# Patient Record
Sex: MALE | Race: WHITE | NOT HISPANIC OR LATINO | Employment: FULL TIME | URBAN - METROPOLITAN AREA
[De-identification: names, ages, dates, MRNs, and addresses within clinical notes are randomized per-mention and may not be internally consistent; named-entity substitution may affect disease eponyms.]

---

## 2017-07-19 ENCOUNTER — HOSPITAL ENCOUNTER (EMERGENCY)
Facility: HOSPITAL | Age: 27
Discharge: HOME | End: 2017-07-19
Attending: EMERGENCY MEDICINE
Payer: COMMERCIAL

## 2017-07-19 ENCOUNTER — APPOINTMENT (EMERGENCY)
Dept: CT IMAGING | Facility: HOSPITAL | Age: 27
End: 2017-07-19
Payer: COMMERCIAL

## 2017-07-19 VITALS
DIASTOLIC BLOOD PRESSURE: 82 MMHG | OXYGEN SATURATION: 99 % | RESPIRATION RATE: 18 BRPM | WEIGHT: 183.64 LBS | HEART RATE: 64 BPM | SYSTOLIC BLOOD PRESSURE: 127 MMHG | TEMPERATURE: 98.2 F

## 2017-07-19 DIAGNOSIS — G43.909 MIGRAINE HEADACHE: Primary | ICD-10-CM

## 2017-07-19 PROCEDURE — 99284 EMERGENCY DEPT VISIT MOD MDM: CPT

## 2017-07-19 PROCEDURE — 70450 CT HEAD/BRAIN W/O DYE: CPT

## 2017-07-19 RX ORDER — DIAZEPAM 5 MG/1
5 TABLET ORAL ONCE
Status: COMPLETED | OUTPATIENT
Start: 2017-07-19 | End: 2017-07-19

## 2017-07-19 RX ORDER — BUTALBITAL, ACETAMINOPHEN AND CAFFEINE 50; 325; 40 MG/1; MG/1; MG/1
1 TABLET ORAL EVERY 6 HOURS PRN
Qty: 20 TABLET | Refills: 0 | Status: SHIPPED | OUTPATIENT
Start: 2017-07-19 | End: 2021-02-26

## 2017-07-19 RX ORDER — ACETAMINOPHEN 500 MG
1000 TABLET ORAL EVERY 6 HOURS PRN
COMMUNITY
End: 2021-02-26

## 2017-07-19 RX ORDER — METOCLOPRAMIDE 10 MG/1
10 TABLET ORAL ONCE
Status: COMPLETED | OUTPATIENT
Start: 2017-07-19 | End: 2017-07-19

## 2017-07-19 RX ORDER — BUTALBITAL, ACETAMINOPHEN AND CAFFEINE 50; 325; 40 MG/1; MG/1; MG/1
1 TABLET ORAL ONCE
Status: COMPLETED | OUTPATIENT
Start: 2017-07-19 | End: 2017-07-19

## 2017-07-19 RX ADMIN — METOCLOPRAMIDE HYDROCHLORIDE 10 MG: 10 TABLET ORAL at 21:57

## 2017-07-19 RX ADMIN — BUTALBITAL, ACETAMINOPHEN, AND CAFFEINE 1 TABLET: 50; 325; 40 TABLET ORAL at 21:57

## 2017-07-19 RX ADMIN — DIAZEPAM 5 MG: 5 TABLET ORAL at 21:57

## 2018-08-28 ENCOUNTER — APPOINTMENT (OUTPATIENT)
Dept: URGENT CARE | Age: 28
End: 2018-08-28
Payer: OTHER MISCELLANEOUS

## 2018-08-28 PROCEDURE — 99283 EMERGENCY DEPT VISIT LOW MDM: CPT

## 2018-08-28 PROCEDURE — G0382 LEV 3 HOSP TYPE B ED VISIT: HCPCS

## 2018-08-29 ENCOUNTER — APPOINTMENT (OUTPATIENT)
Dept: URGENT CARE | Age: 28
End: 2018-08-29
Payer: OTHER MISCELLANEOUS

## 2018-08-29 PROCEDURE — 99213 OFFICE O/P EST LOW 20 MIN: CPT

## 2020-09-29 ENCOUNTER — OFFICE VISIT (OUTPATIENT)
Dept: URGENT CARE | Age: 30
End: 2020-09-29
Payer: COMMERCIAL

## 2020-09-29 VITALS
WEIGHT: 190 LBS | TEMPERATURE: 98.2 F | HEART RATE: 84 BPM | HEIGHT: 72 IN | SYSTOLIC BLOOD PRESSURE: 133 MMHG | BODY MASS INDEX: 25.73 KG/M2 | OXYGEN SATURATION: 100 % | DIASTOLIC BLOOD PRESSURE: 86 MMHG | RESPIRATION RATE: 16 BRPM

## 2020-09-29 DIAGNOSIS — L03.116 CELLULITIS OF LEFT LOWER EXTREMITY: ICD-10-CM
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
## 2020-09-29 PROCEDURE — 99212 OFFICE O/P EST SF 10 MIN: CPT | Performed by: FAMILY MEDICINE

## 2020-09-29 RX ORDER — CLINDAMYCIN HYDROCHLORIDE 300 MG/1
300 CAPSULE ORAL 3 TIMES DAILY
Qty: 30 CAPSULE | Refills: 0 | Status: SHIPPED | OUTPATIENT
Start: 2020-09-29 | End: 2020-10-09

## 2020-09-29 NOTE — PROGRESS NOTES
[facility]        NAME: Berna Carnes is a 27 y o  male  : 1990    MRN: [id number]  DATE: 2020  TIME: 7:56 PM    Assessment and Plan   Insect bites and stings, initial encounter [W57  XXXA]  1  Insect bites and stings, initial encounter     2  Cellulitis of left lower extremity  clindamycin (CLEOCIN) 300 MG capsule    mupirocin (BACTROBAN) 2 % ointment         Patient Instructions     Patient Instructions   Clindamycin 3 times a day until finished (please take probiotics)  Prescription antibiotic ointment to areas 3 times a day until healed  Recheck/follow-up with family physician as discussed  Please go to the hospital emergency department if needed  Follow up with PCP in 3-5 days  Proceed to  ER if symptoms worsen  Chief Complaint     Chief Complaint   Patient presents with    Rash     Pt complaining of a rash to b/l ankles x1 week  States it occassional itches  History of Present Illness       Insect bites of bilateral feet/ankles/lower legs; redness of some of the skin lesions of the left ankle/left lower leg areas      Review of Systems   Review of Systems   Skin: Positive for wound          Insect bites of both lower legs/ankle/feet with redness of some of the bites on the left lower leg/left ankle areas         Current Medications       Current Outpatient Medications:     acetaminophen (TYLENOL) 500 mg tablet, Take 1,000 mg by mouth every 6 (six) hours as needed for mild pain, Disp: , Rfl:     butalbital-acetaminophen-caffeine (FIORICET) -40 mg per tablet, Take 1 tablet by mouth every 6 (six) hours as needed for headaches (Patient not taking: Reported on 2020), Disp: 20 tablet, Rfl: 0    clindamycin (CLEOCIN) 300 MG capsule, Take 1 capsule (300 mg total) by mouth 3 (three) times a day for 30 doses, Disp: 30 capsule, Rfl: 0    mupirocin (BACTROBAN) 2 % ointment, Apply topically 3 (three) times a day, Disp: 22 g, Rfl: 2    Current Allergies     Allergies as of 09/29/2020 - Reviewed 09/29/2020   Allergen Reaction Noted    Aspirin Anaphylaxis 07/19/2017    Codeine Anaphylaxis 07/19/2017    Nsaids Anaphylaxis 07/19/2017            The following portions of the patient's history were reviewed and updated as appropriate: allergies, current medications, past family history, past medical history, past social history, past surgical history and problem list      History reviewed  No pertinent past medical history  Past Surgical History:   Procedure Laterality Date    TONSILLECTOMY         History reviewed  No pertinent family history  Medications have been verified  Objective   /86 (BP Location: Left arm, Patient Position: Sitting)   Pulse 84   Temp 98 2 °F (36 8 °C) (Temporal)   Resp 16   Ht 6' (1 829 m)   Wt 86 2 kg (190 lb)   SpO2 100%   BMI 25 77 kg/m²        Physical Exam     Physical Exam  Vitals signs and nursing note reviewed  Skin:     Comments: Insect bites both lower leg/ankle/feet; erythema and drainage of some of the skin lesions of the left lower leg/left ankle areas   Neurological:      Mental Status: He is alert and oriented to person, place, and time

## 2020-09-29 NOTE — PATIENT INSTRUCTIONS
Clindamycin 3 times a day until finished (please take probiotics)  Prescription antibiotic ointment to areas 3 times a day until healed  Recheck/follow-up with family physician as discussed  Please go to the hospital emergency department if needed

## 2020-12-29 ENCOUNTER — APPOINTMENT (OUTPATIENT)
Dept: URGENT CARE | Age: 30
End: 2020-12-29
Attending: FAMILY MEDICINE

## 2020-12-29 ENCOUNTER — TRANSCRIBE ORDERS (OUTPATIENT)
Dept: ADMINISTRATIVE | Age: 30
End: 2020-12-29

## 2020-12-29 DIAGNOSIS — Z11.59 SPECIAL SCREENING EXAMINATION FOR UNSPECIFIED VIRAL DISEASE: Primary | ICD-10-CM

## 2020-12-29 DIAGNOSIS — Z11.59 SPECIAL SCREENING EXAMINATION FOR UNSPECIFIED VIRAL DISEASE: ICD-10-CM

## 2020-12-29 PROCEDURE — U0003 INFECTIOUS AGENT DETECTION BY NUCLEIC ACID (DNA OR RNA); SEVERE ACUTE RESPIRATORY SYNDROME CORONAVIRUS 2 (SARS-COV-2) (CORONAVIRUS DISEASE [COVID-19]), AMPLIFIED PROBE TECHNIQUE, MAKING USE OF HIGH THROUGHPUT TECHNOLOGIES AS DESCRIBED BY CMS-2020-01-R: HCPCS

## 2020-12-31 LAB — SARS-COV-2 RNA SPEC QL NAA+PROBE: NOT DETECTED

## 2021-02-26 ENCOUNTER — APPOINTMENT (EMERGENCY)
Dept: RADIOLOGY | Facility: HOSPITAL | Age: 31
End: 2021-02-26
Payer: COMMERCIAL

## 2021-02-26 ENCOUNTER — HOSPITAL ENCOUNTER (EMERGENCY)
Facility: HOSPITAL | Age: 31
Discharge: HOME | End: 2021-02-26
Attending: EMERGENCY MEDICINE
Payer: COMMERCIAL

## 2021-02-26 VITALS
TEMPERATURE: 98.5 F | OXYGEN SATURATION: 98 % | SYSTOLIC BLOOD PRESSURE: 155 MMHG | WEIGHT: 190 LBS | BODY MASS INDEX: 25.77 KG/M2 | DIASTOLIC BLOOD PRESSURE: 90 MMHG | RESPIRATION RATE: 16 BRPM | HEART RATE: 78 BPM

## 2021-02-26 DIAGNOSIS — R00.2 PALPITATIONS: Primary | ICD-10-CM

## 2021-02-26 LAB
ALBUMIN SERPL BCP-MCNC: 4 G/DL (ref 3.5–5)
ALP SERPL-CCNC: 92 U/L (ref 46–116)
ALT SERPL W P-5'-P-CCNC: 42 U/L (ref 12–78)
ANION GAP SERPL CALCULATED.3IONS-SCNC: 5 MMOL/L (ref 4–13)
AST SERPL W P-5'-P-CCNC: 46 U/L (ref 5–45)
BASOPHILS # BLD AUTO: 0.02 THOUSANDS/ΜL (ref 0–0.1)
BASOPHILS NFR BLD AUTO: 0 % (ref 0–1)
BILIRUB SERPL-MCNC: 0.82 MG/DL (ref 0.2–1)
BUN SERPL-MCNC: 10 MG/DL (ref 5–25)
CALCIUM SERPL-MCNC: 8.8 MG/DL (ref 8.3–10.1)
CHLORIDE SERPL-SCNC: 100 MMOL/L (ref 100–108)
CO2 SERPL-SCNC: 30 MMOL/L (ref 21–32)
CREAT SERPL-MCNC: 1.08 MG/DL (ref 0.6–1.3)
D DIMER PPP FEU-MCNC: 0.31 UG/ML FEU
EOSINOPHIL # BLD AUTO: 0.08 THOUSAND/ΜL (ref 0–0.61)
EOSINOPHIL NFR BLD AUTO: 1 % (ref 0–6)
ERYTHROCYTE [DISTWIDTH] IN BLOOD BY AUTOMATED COUNT: 12 % (ref 11.6–15.1)
GFR SERPL CREATININE-BSD FRML MDRD: 92 ML/MIN/1.73SQ M
GLUCOSE SERPL-MCNC: 90 MG/DL (ref 65–140)
HCT VFR BLD AUTO: 45.6 % (ref 36.5–49.3)
HGB BLD-MCNC: 15.8 G/DL (ref 12–17)
IMM GRANULOCYTES # BLD AUTO: 0.09 THOUSAND/UL (ref 0–0.2)
IMM GRANULOCYTES NFR BLD AUTO: 1 % (ref 0–2)
LYMPHOCYTES # BLD AUTO: 0.8 THOUSANDS/ΜL (ref 0.6–4.47)
LYMPHOCYTES NFR BLD AUTO: 10 % (ref 14–44)
MAGNESIUM SERPL-MCNC: 2 MG/DL (ref 1.6–2.6)
MCH RBC QN AUTO: 31.4 PG (ref 26.8–34.3)
MCHC RBC AUTO-ENTMCNC: 34.6 G/DL (ref 31.4–37.4)
MCV RBC AUTO: 91 FL (ref 82–98)
MONOCYTES # BLD AUTO: 0.59 THOUSAND/ΜL (ref 0.17–1.22)
MONOCYTES NFR BLD AUTO: 8 % (ref 4–12)
NEUTROPHILS # BLD AUTO: 6.3 THOUSANDS/ΜL (ref 1.85–7.62)
NEUTS SEG NFR BLD AUTO: 80 % (ref 43–75)
NRBC BLD AUTO-RTO: 0 /100 WBCS
PHOSPHATE SERPL-MCNC: 3 MG/DL (ref 2.7–4.5)
PLATELET # BLD AUTO: 238 THOUSANDS/UL (ref 149–390)
PMV BLD AUTO: 9.5 FL (ref 8.9–12.7)
POTASSIUM SERPL-SCNC: 4.2 MMOL/L (ref 3.5–5.3)
PROT SERPL-MCNC: 7.5 G/DL (ref 6.4–8.2)
RBC # BLD AUTO: 5.03 MILLION/UL (ref 3.88–5.62)
SODIUM SERPL-SCNC: 135 MMOL/L (ref 136–145)
TROPONIN I SERPL-MCNC: <0.02 NG/ML
TSH SERPL DL<=0.05 MIU/L-ACNC: 0.91 UIU/ML (ref 0.36–3.74)
WBC # BLD AUTO: 7.88 THOUSAND/UL (ref 4.31–10.16)

## 2021-02-26 PROCEDURE — 36415 COLL VENOUS BLD VENIPUNCTURE: CPT | Performed by: EMERGENCY MEDICINE

## 2021-02-26 PROCEDURE — 96361 HYDRATE IV INFUSION ADD-ON: CPT

## 2021-02-26 PROCEDURE — 85379 FIBRIN DEGRADATION QUANT: CPT | Performed by: EMERGENCY MEDICINE

## 2021-02-26 PROCEDURE — 96360 HYDRATION IV INFUSION INIT: CPT

## 2021-02-26 PROCEDURE — 93005 ELECTROCARDIOGRAM TRACING: CPT

## 2021-02-26 PROCEDURE — 85025 COMPLETE CBC W/AUTO DIFF WBC: CPT | Performed by: EMERGENCY MEDICINE

## 2021-02-26 PROCEDURE — 84443 ASSAY THYROID STIM HORMONE: CPT | Performed by: EMERGENCY MEDICINE

## 2021-02-26 PROCEDURE — 99285 EMERGENCY DEPT VISIT HI MDM: CPT

## 2021-02-26 PROCEDURE — 80053 COMPREHEN METABOLIC PANEL: CPT | Performed by: EMERGENCY MEDICINE

## 2021-02-26 PROCEDURE — 84484 ASSAY OF TROPONIN QUANT: CPT | Performed by: EMERGENCY MEDICINE

## 2021-02-26 PROCEDURE — 99285 EMERGENCY DEPT VISIT HI MDM: CPT | Performed by: EMERGENCY MEDICINE

## 2021-02-26 PROCEDURE — 71045 X-RAY EXAM CHEST 1 VIEW: CPT

## 2021-02-26 PROCEDURE — 84100 ASSAY OF PHOSPHORUS: CPT | Performed by: EMERGENCY MEDICINE

## 2021-02-26 PROCEDURE — 83735 ASSAY OF MAGNESIUM: CPT | Performed by: EMERGENCY MEDICINE

## 2021-02-26 RX ADMIN — SODIUM CHLORIDE 1000 ML: 0.9 INJECTION, SOLUTION INTRAVENOUS at 13:05

## 2021-02-26 NOTE — DISCHARGE INSTRUCTIONS
If you continue to have these types of symptoms, you should follow-up with your primary care doctor talk about obtaining a Holter monitor  A Holter monitor looks at the rhythm and the rate of your heart throughout 24 hours or more  This will help your physician understand what may be happening to you when you have these types of events  Otherwise, if you develop severe shortness of breath, intractable chest pain, or any other concerning symptoms, please return to the emergency department as these could be signs of worsening illness

## 2021-02-26 NOTE — ED PROVIDER NOTES
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  No significant past medical history other than having had COVID 2 months ago  Patient states that he checked his Apple watch while he felt unwell and noted that his heart rate was elevated  Patient tried to sit down and drink water and his heart rate remained in the 100s, prompting visit to the emergency department  Patient states that he had a some ice tea year earlier today, unclear if caffeinated  Denies drip heavy caffeine intake  Denies recreational substances or alcohol  Denies fever, cough, sore throat, nausea, vomiting, abdominal pain, urinary symptoms, changes in stool, leg pain or leg swelling, rash  Denies recent travel, no sick contacts  None       History reviewed  No pertinent past medical history  Past Surgical History:   Procedure Laterality Date    TONSILLECTOMY         History reviewed  No pertinent family history  I have reviewed and agree with the history as documented  E-Cigarette/Vaping     E-Cigarette/Vaping Substances     Social History     Tobacco Use    Smoking status: Never Smoker    Smokeless tobacco: Never Used   Substance Use Topics    Alcohol use: No    Drug use: No       Review of Systems   Constitutional: Negative for activity change, chills and fever  HENT: Negative for sneezing and sore throat  Eyes: Negative for pain  Respiratory: Positive for cough and shortness of breath  Negative for apnea, choking, chest tightness, wheezing and stridor  Cardiovascular: Positive for chest pain and palpitations  Negative for leg swelling     Gastrointestinal: Negative for abdominal distention, abdominal pain, anal bleeding, blood in stool, constipation, diarrhea, nausea, rectal pain and vomiting  Genitourinary: Negative for dysuria and hematuria  Musculoskeletal: Negative for back pain, gait problem, myalgias, neck pain and neck stiffness  Skin: Negative for color change, pallor, rash and wound  Neurological: Negative for dizziness, tremors, seizures, syncope, facial asymmetry, speech difficulty, weakness, light-headedness, numbness and headaches  Physical Exam  Physical Exam  Vitals signs and nursing note reviewed  Constitutional:       General: He is not in acute distress  Appearance: He is well-developed  He is not ill-appearing, toxic-appearing or diaphoretic  HENT:      Head: Normocephalic and atraumatic  Right Ear: External ear normal       Left Ear: External ear normal       Nose: Nose normal    Eyes:      General:         Right eye: No discharge  Left eye: No discharge  Conjunctiva/sclera: Conjunctivae normal       Pupils: Pupils are equal, round, and reactive to light  Neck:      Musculoskeletal: Normal range of motion and neck supple  Cardiovascular:      Rate and Rhythm: Regular rhythm  Tachycardia present  Heart sounds: Normal heart sounds  No friction rub  No gallop  Pulmonary:      Effort: Pulmonary effort is normal  No respiratory distress  Breath sounds: Normal breath sounds  No stridor  No wheezing, rhonchi or rales  Chest:      Chest wall: No tenderness  Abdominal:      General: Bowel sounds are normal       Palpations: Abdomen is soft  Tenderness: There is no abdominal tenderness  Musculoskeletal: Normal range of motion  General: No tenderness or deformity  Right lower leg: He exhibits no tenderness  No edema  Left lower leg: He exhibits no tenderness  No edema  Skin:     General: Skin is warm and dry  Capillary Refill: Capillary refill takes less than 2 seconds  Coloration: Skin is pale  Neurological:      Mental Status: He is alert and oriented to person, place, and time  Psychiatric:         Mood and Affect: Mood is anxious  Vital Signs  ED Triage Vitals [02/26/21 1245]   Temperature Pulse Respirations Blood Pressure SpO2   98 5 °F (36 9 °C) (!) 106 16 162/88 97 %      Temp src Heart Rate Source Patient Position - Orthostatic VS BP Location FiO2 (%)   -- Monitor Sitting Right arm --      Pain Score       --           Vitals:    02/26/21 1245   BP: 162/88   Pulse: (!) 106   Patient Position - Orthostatic VS: Sitting         Visual Acuity      ED Medications  Medications   sodium chloride 0 9 % bolus 1,000 mL (1,000 mL Intravenous New Bag 2/26/21 1305)       Diagnostic Studies  Results Reviewed     Procedure Component Value Units Date/Time    CBC and differential [[id number]]  (Abnormal) Collected: 02/26/21 1251    Lab Status: Final result Specimen: Blood from Arm, Left Updated: 02/26/21 1259     WBC 7 88 Thousand/uL      RBC 5 03 Million/uL      Hemoglobin 15 8 g/dL      Hematocrit 45 6 %      MCV 91 fL      MCH 31 4 pg      MCHC 34 6 g/dL      RDW 12 0 %      MPV 9 5 fL      Platelets 262 Thousands/uL      nRBC 0 /100 WBCs      Neutrophils Relative 80 %      Immat GRANS % 1 %      Lymphocytes Relative 10 %      Monocytes Relative 8 %      Eosinophils Relative 1 %      Basophils Relative 0 %      Neutrophils Absolute 6 30 Thousands/µL      Immature Grans Absolute 0 09 Thousand/uL      Lymphocytes Absolute 0 80 Thousands/µL      Monocytes Absolute 0 59 Thousand/µL      Eosinophils Absolute 0 08 Thousand/µL      Basophils Absolute 0 02 Thousands/µL     D-Dimer [[id number]] Collected: 02/26/21 1251    Lab Status: In process Specimen: Blood from Arm, Left Updated: 02/26/21 1256    Troponin I [[id number]] Collected: 02/26/21 1251    Lab Status:  In process Specimen: Blood from Arm, Left Updated: 02/26/21 1255    Comprehensive metabolic panel [[id number]] Collected: 02/26/21 1251    Lab Status: In process Specimen: Blood from Arm, Left Updated: 02/26/21 1255    TSH [[id number]] Collected: 02/26/21 1251    Lab Status: In process Specimen: Blood from Arm, Left Updated: 02/26/21 1255    Magnesium [[id number]] Collected: 02/26/21 1251    Lab Status: In process Specimen: Blood from Arm, Left Updated: 02/26/21 1255    Phosphorus [[id number]] Collected: 02/26/21 1251    Lab Status: In process Specimen: Blood from Arm, Left Updated: 02/26/21 1255                 XR chest 1 view portable    (Results Pending)              Procedures  Procedures         ED Course  ED Course as of Feb 26 1627 Fri Feb 26, 2021   1255 Normal sinus rhythm 99, QRS 90, , no ST elevation or depression, no ectopy, terminal R is present  Incomplete right bundle pattern noted  Which was present on prior EKG  ECG 12 lead   155 80-year-old male presenting to the emergency department with palpitations, shortness of breath, chest pain  Vital signs upon arrival notable for tachycardia  Physical exam is remarkable for an anxious appearing male with sinus tachycardia rate 110 at bedside  No other specific findings  Lungs clear to auscultation  Differential diagnosis includes sinus tach, PE, electrolyte imbalance, problem, ACS  Lab work and imaging ordered  1321 Troponin I: <0 02   1321 D-Dimer, Quant: 0 31   1330 No acute findings  XR chest 1 view portable   1330 CBC, CMP, D-dimer, troponin, TSH, phosphorus, magnesium are all grossly unremarkable  1345 Patient remained hemodynamically and clinically stable in the emergency department for 1 hour  Repeat heart rate showed 97 at bedside  No evidence of impending cardiopulmonary instability  Recommended the patient follow-up with primary care doctor regarding event and possible Holter monitoring  Strict return precautions given  Upon discharge, patient was fully alert, oriented, GCS 15, ambulatory, without any further complaints    Patient verbalized understanding of plan  MDM    Disposition  Final diagnoses:   None     ED Disposition     None      Follow-up Information    None         Patient's Medications   Discharge Prescriptions    No medications on file     No discharge procedures on file      PDMP Review     None          ED Provider  Electronically Signed by           Varsha Saleem MD  02/26/21 7430

## 2021-02-28 LAB
ATRIAL RATE: 104 BPM
P AXIS: 59 DEGREES
PR INTERVAL: 160 MS
QRS AXIS: -9 DEGREES
QRSD INTERVAL: 90 MS
QT INTERVAL: 318 MS
QTC INTERVAL: 408 MS
T WAVE AXIS: 37 DEGREES
VENTRICULAR RATE: 99 BPM

## 2021-02-28 PROCEDURE — 93010 ELECTROCARDIOGRAM REPORT: CPT | Performed by: INTERNAL MEDICINE

## 2021-03-03 ENCOUNTER — TRANSCRIBE ORDERS (OUTPATIENT)
Dept: ADMINISTRATIVE | Facility: HOSPITAL | Age: 31
End: 2021-03-03

## 2021-03-03 DIAGNOSIS — R07.9 CHEST PAIN, UNSPECIFIED: Primary | ICD-10-CM

## 2021-04-05 DIAGNOSIS — Z23 ENCOUNTER FOR IMMUNIZATION: ICD-10-CM

## 2021-04-10 ENCOUNTER — IMMUNIZATIONS (OUTPATIENT)
Dept: FAMILY MEDICINE CLINIC | Facility: HOSPITAL | Age: 31
End: 2021-04-10

## 2021-04-10 DIAGNOSIS — Z23 ENCOUNTER FOR IMMUNIZATION: Primary | ICD-10-CM

## 2021-04-10 PROCEDURE — 0001A SARS-COV-2 / COVID-19 MRNA VACCINE (PFIZER-BIONTECH) 30 MCG: CPT

## 2021-04-10 PROCEDURE — 91300 SARS-COV-2 / COVID-19 MRNA VACCINE (PFIZER-BIONTECH) 30 MCG: CPT

## 2021-05-01 ENCOUNTER — IMMUNIZATIONS (OUTPATIENT)
Dept: FAMILY MEDICINE CLINIC | Facility: HOSPITAL | Age: 31
End: 2021-05-01

## 2021-05-01 DIAGNOSIS — Z23 ENCOUNTER FOR IMMUNIZATION: Primary | ICD-10-CM

## 2021-05-01 PROCEDURE — 0002A SARS-COV-2 / COVID-19 MRNA VACCINE (PFIZER-BIONTECH) 30 MCG: CPT

## 2021-05-01 PROCEDURE — 91300 SARS-COV-2 / COVID-19 MRNA VACCINE (PFIZER-BIONTECH) 30 MCG: CPT

## 2022-06-09 ENCOUNTER — HOSPITAL ENCOUNTER (EMERGENCY)
Facility: HOSPITAL | Age: 32
Discharge: HOME | End: 2022-06-09
Attending: EMERGENCY MEDICINE
Payer: COMMERCIAL

## 2022-06-09 VITALS
SYSTOLIC BLOOD PRESSURE: 174 MMHG | RESPIRATION RATE: 18 BRPM | HEART RATE: 78 BPM | TEMPERATURE: 98 F | DIASTOLIC BLOOD PRESSURE: 105 MMHG | OXYGEN SATURATION: 98 %

## 2022-06-09 DIAGNOSIS — R03.0 ELEVATED BLOOD PRESSURE READING: ICD-10-CM

## 2022-06-09 DIAGNOSIS — M62.838 TRAPEZIUS MUSCLE SPASM: Primary | ICD-10-CM
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
## 2022-06-09 PROCEDURE — 99282 EMERGENCY DEPT VISIT SF MDM: CPT | Performed by: EMERGENCY MEDICINE

## 2022-06-09 PROCEDURE — 99283 EMERGENCY DEPT VISIT LOW MDM: CPT

## 2022-06-09 RX ORDER — ACETAMINOPHEN 325 MG/1
650 TABLET ORAL ONCE
Status: COMPLETED | OUTPATIENT
Start: 2022-06-09 | End: 2022-06-09

## 2022-06-09 RX ADMIN — ACETAMINOPHEN 650 MG: 325 TABLET ORAL at 09:21
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
## 2022-06-09 NOTE — Clinical Note
Candelaria Mahan was seen and treated in our emergency department on 6/9/2022  No restrictions            Diagnosis:     Yeison Hinton  may return to work on return date  He may return on this date: 06/10/2022         If you have any questions or concerns, please don't hesitate to call        Prabhu Avila DO    ______________________________           _______________          _______________  Hospital Representative                              Date                                Time

## 2024-03-04 ENCOUNTER — OFFICE VISIT (OUTPATIENT)
Dept: URGENT CARE | Age: 34
End: 2024-03-04
Payer: COMMERCIAL

## 2024-03-04 VITALS
RESPIRATION RATE: 18 BRPM | HEART RATE: 91 BPM | DIASTOLIC BLOOD PRESSURE: 90 MMHG | OXYGEN SATURATION: 98 % | TEMPERATURE: 98.1 F | SYSTOLIC BLOOD PRESSURE: 132 MMHG

## 2024-03-04 DIAGNOSIS — J06.9 UPPER RESPIRATORY TRACT INFECTION, UNSPECIFIED TYPE: Primary | ICD-10-CM

## 2024-03-04 PROCEDURE — 99213 OFFICE O/P EST LOW 20 MIN: CPT | Performed by: NURSE PRACTITIONER

## 2024-03-04 NOTE — LETTER
Home Care After Surgery  By Ariadna Hendrickson, Shreyas & Charley    Vitrectomy    Scleral Buckle     A Vitrectomy is a surgery to remove the jelly-like vitreous, often in conjuction with blood or scar tissue, in the center of the eye.  This is then replaced by a clear liquid, gas or silicone oil.    A Scleral Buckle is a surgery to seal a tear and repair a retinal detachment with a silicone band which is left permanently in place around the eye.    If there is a bubble in your eye you can expect it to go away in anywhere from a few days to six weeks, depending on what type of intraocular gas was used by your surgeon.    Comfort Measures:  1.  Some discomfort after surgery is expected and can often be relieved by taking two tablets of Tylenol (Acetaminophen) up to every 4 hours or medications such as Advil or Alleve in recommended doses.  2. If you have nausea, you may use medication if prescribed by your physician.  Try to at least sip on water or other clear liquids intermittently to prevent dehydration.  3. Cool moist washcloths or ice can be placed over your operative eye for the first 24 to 48 hours.  After 48 hours some patients find warm compresses soothing.  Some swelling of the lids is normal, with this potentially becoming quite prominent, especially if a face down position is required.  4. If you have significant pain in the operative eye not relived by the above medications or other questions, a [facility] doctor is available 24 hours a day to help you at [phone] or [phone].    Eye Care:  DO NOT RUB YOUR EYES  1. The operative eye should be kept as clean as possible.  2. Always wash hands with soap and warm water and dry them on a clean towel before performing any type of eye care.  3. To clean the eye, take a cotton ball or clean wash cloth and wet it with tap water,   Close your eye and gently wipe it.    Some drainage, blood tinged tears, or crusting on the lid for the first  March 4, 2024     Patient: Rod Acosta   YOB: 1990   Date of Visit: 3/4/2024       To Whom it May Concern:    Rod Acosta was seen in my clinic on 3/4/2024. He may return to work on 03/05/2024 .    If you have any questions or concerns, please don't hesitate to call.         Sincerely,          HI Mcdonough        CC: No Recipients   5 to 7 days is not unusual.  Eye Drops:  1. Your eye drops will be labeled with directions for your use. Bring all your eye drops to your eye appointment the day after surgery.  2. USE ONLY THE EYE DROPS THAT YOU WERE INSTRUCTED TO USE BY YOUR PHYSICIAN.    Head Position:  The way you hold your head is very important IF an intraocular gas bubble or silicone oil were placed at the time of surgery.  The nursing staff will instruct you on what position if any your head must be held in and how many days your specific position is required.    Your specific position will be:Face down 20 hours per day for five days, may sit briefly for meals and bathroom privileges, not to lie on your back.     NO NOT LAY ON YOUR BACK AS LONG AS THERE IS A BUBBLE IN YOUR EYE    You may sit up to eat, go to the bathroom, come to the doctor for your appointments and to stretch your legs, however, these periods should not be for more than approximately 20 minutes at a time and should not add up to more than 3 or 4 hours per day unless otherwise indicated by your physician    Eye Patches:  1. You need to wear a shield or glasses over the operated eye at home, especially at night for the first two weeks after surgery.  2. Do not replace the gauze pad underneath the plastic or metal shield.    Activity Allowed at Home:  1. Bending and stooping to perform nonstrenuous activities such as tying your shoes or picking up a light object.  2. Watching TV  3. May bathe but, always keep your eyes dry.  4. Walking and climbing stairs.  5. Combing and washing your hair, attempting to avoid soapy water in the eye.     Activity Not Allowed Until Your Next Visit to the Doctor:  1. Car rides except to keep your appointment with your physician.  2. Going into an area with dust or dirt in the air.  3. Driving.  4. Heavy housework (scrubbing floor, vacuuming, lifting things that are heavier than what you can lift with one hand, etc.)  5. Sports  activities.  6. Swimming.  7. Care of small children.  8. Avoid eye makeup for 1- 3 weeks as directed by your surgeon.  9. Ask your doctor when you can return to work and return to usual activity.     Call Your Doctor if You Have Any of The Following Things Happen:  1. Increased persistent pain in your operated eye.  2. Sudden persistent decrease in how well you can see.  3. More drainage or change in color of the drainage from your operative eye.    IF YOU HAVE ANY QUESTIONS OR PROBLEMS CALL [phone] or  [phone]; A [facility] DOCTOR IS AVAILABLE 24 HOURS PER DAY.     Appointment :    Patient and significant other have verbalized  understanding of these instructions and have  received a copy.     It has been our privilege to take care of you today.  Our goal has been to make sure you and your family always felt confident in you care while you were here.  If at any time after you leave that questions, concerns, or worries should arise do not hesitate to contact us.  It is always important that we treat you with the courtesy and respect that you deserve.  Thank you for choosing [facility] for your care today!

## 2024-03-04 NOTE — PROGRESS NOTES
[facility]        NAME: Rod Acosta is a 33 y.o. male  : 1990    MRN: [id number]  DATE: 2024  TIME: 11:38 AM    Assessment and Plan   Upper respiratory tract infection, unspecified type [J06.9]  1. Upper respiratory tract infection, unspecified type              Patient Instructions     Declined covid testing  Excused from work for today  OTC med for cold symptoms   Follow up with PCP in 3-5 days.  Proceed to  ER if symptoms worsen.    If tests have been performed at [facility], our office will contact you with results if changes need to be made to the care plan discussed with you at the visit.  [facility]    Chief Complaint     Chief Complaint   Patient presents with    Cold Like Symptoms     Patient states that since Satruday he has had HA, nasal congestion, sore throat, and dry cough. He denies chest congestion and SOB. He has not had fever or chills.          History of Present Illness       HPI  Reports cold symptoms x 3 days. Getting better. Did not do a covid test at home. Does not want one today. Symptoms include head cold, nose congestion, sore throat and cough.     Review of Systems   Review of Systems   Constitutional:  Negative for chills and fever.   HENT:  Positive for congestion, rhinorrhea, sinus pressure and sore throat. Negative for ear pain.    Respiratory:  Positive for cough. Negative for chest tightness, shortness of breath and wheezing.    Cardiovascular:  Negative for chest pain.   Gastrointestinal:  Negative for diarrhea and vomiting.   Neurological:  Negative for headaches.         Current Medications     No current outpatient medications on file.    Current Allergies     Allergies as of 2024 - Reviewed 2024   Allergen Reaction Noted    Aspirin Anaphylaxis 2017    Codeine Anaphylaxis 2017    Nsaids Anaphylaxis 2017            The following portions of the patient's history were  reviewed and updated as appropriate: allergies, current medications, past family history, past medical history, past social history, past surgical history and problem list.     No past medical history on file.    Past Surgical History:   Procedure Laterality Date    TONSILLECTOMY         No family history on file.      Medications have been verified.        Objective   /90   Pulse 91   Temp 98.1 °F (36.7 °C)   Resp 18   SpO2 98%   No LMP for male patient.       Physical Exam     Physical Exam  Constitutional:       Appearance: He is not ill-appearing or diaphoretic.   HENT:      Right Ear: Tympanic membrane normal.      Left Ear: Tympanic membrane normal.      Nose: Rhinorrhea present.      Mouth/Throat:      Pharynx: No posterior oropharyngeal erythema.   Cardiovascular:      Rate and Rhythm: Regular rhythm.      Heart sounds: Normal heart sounds.   Pulmonary:      Effort: Pulmonary effort is normal.      Breath sounds: Normal breath sounds. No wheezing.